# Patient Record
Sex: FEMALE | Race: ASIAN | NOT HISPANIC OR LATINO
[De-identification: names, ages, dates, MRNs, and addresses within clinical notes are randomized per-mention and may not be internally consistent; named-entity substitution may affect disease eponyms.]

---

## 2020-03-23 ENCOUNTER — TRANSCRIPTION ENCOUNTER (OUTPATIENT)
Age: 40
End: 2020-03-23

## 2020-04-26 ENCOUNTER — MESSAGE (OUTPATIENT)
Age: 40
End: 2020-04-26

## 2020-05-02 LAB
SARS-COV-2 IGG SERPL IA-ACNC: 4.6 INDEX
SARS-COV-2 IGG SERPL QL IA: POSITIVE

## 2020-05-04 ENCOUNTER — APPOINTMENT (OUTPATIENT)
Dept: DISASTER EMERGENCY | Facility: CLINIC | Age: 40
End: 2020-05-04

## 2020-08-17 ENCOUNTER — TRANSCRIPTION ENCOUNTER (OUTPATIENT)
Age: 40
End: 2020-08-17

## 2020-09-04 ENCOUNTER — TRANSCRIPTION ENCOUNTER (OUTPATIENT)
Age: 40
End: 2020-09-04

## 2020-09-04 ENCOUNTER — EMERGENCY (EMERGENCY)
Facility: HOSPITAL | Age: 40
LOS: 0 days | Discharge: HOME | End: 2020-09-04
Payer: OTHER MISCELLANEOUS

## 2020-09-04 VITALS
DIASTOLIC BLOOD PRESSURE: 82 MMHG | SYSTOLIC BLOOD PRESSURE: 132 MMHG | OXYGEN SATURATION: 100 % | WEIGHT: 134.92 LBS | HEIGHT: 62 IN | RESPIRATION RATE: 18 BRPM | HEART RATE: 81 BPM

## 2020-09-04 DIAGNOSIS — Z77.21 CONTACT WITH AND (SUSPECTED) EXPOSURE TO POTENTIALLY HAZARDOUS BODY FLUIDS: ICD-10-CM

## 2020-09-04 PROCEDURE — 99284 EMERGENCY DEPT VISIT MOD MDM: CPT

## 2020-09-04 NOTE — ED ADULT NURSE NOTE - NSIMPLEMENTINTERV_GEN_ALL_ED
Implemented All Fall with Harm Risk Interventions:  [place] to call system. Call bell, personal items and telephone within reach. Instruct patient to call for assistance. Room bathroom lighting operational. Non-slip footwear when patient is off stretcher. Physically safe environment: no spills, clutter or unnecessary equipment. Stretcher in lowest position, wheels locked, appropriate side rails in place. Provide visual cue, wrist band, yellow gown, etc. Monitor gait and stability. Monitor for mental status changes and reorient to person, place, and time. Review medications for side effects contributing to fall risk. Reinforce activity limits and safety measures with patient and family. Provide visual clues: red socks.

## 2020-09-04 NOTE — ED PROVIDER NOTE - MUSCULOSKELETAL, MLM
Right lateral wrist w/ mild erythema, no discharge, non tender. Right lateral wrist w/ mild erythema, no discharge, non tender. open sore 1x1 cm

## 2020-09-04 NOTE — ED PROVIDER NOTE - PATIENT PORTAL LINK FT
You can access the FollowMyHealth Patient Portal offered by [facility] by registering at the following website: [email or web address]. By joining [employer]’s FollowMyHealth portal, you will also be able to view your health information using other applications (apps) compatible with our system.

## 2020-09-04 NOTE — ED PROVIDER NOTE - OBJECTIVE STATEMENT
39 y/o F no pertinent PMHx presents s/p blood exposure. 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 Pt states she had open rash like wound that she had been scratching and the blood got into the wound. Pt states they did not test the patient for any Infectious Diseases.

## 2021-09-28 ENCOUNTER — TRANSCRIPTION ENCOUNTER (OUTPATIENT)
Age: 41
End: 2021-09-28

## 2022-07-05 ENCOUNTER — NON-APPOINTMENT (OUTPATIENT)
Age: 42
End: 2022-07-05

## 2022-08-24 DIAGNOSIS — M77.11 LATERAL EPICONDYLITIS, RIGHT ELBOW: ICD-10-CM

## 2022-08-24 PROBLEM — Z00.00 ENCOUNTER FOR PREVENTIVE HEALTH EXAMINATION: Status: ACTIVE | Noted: 2022-08-24

## 2022-10-03 ENCOUNTER — RX RENEWAL (OUTPATIENT)
Age: 42
End: 2022-10-03

## 2022-10-03 RX ORDER — MELOXICAM 15 MG/1
15 TABLET ORAL DAILY
Qty: 30 | Refills: 0 | Status: ACTIVE | COMMUNITY
Start: 2022-08-24 | End: 1900-01-01

## 2022-10-28 ENCOUNTER — EMERGENCY (EMERGENCY)
Facility: HOSPITAL | Age: 42
LOS: 0 days | Discharge: HOME | End: 2022-10-28

## 2022-10-28 VITALS
HEIGHT: 62 IN | TEMPERATURE: 98 F | OXYGEN SATURATION: 96 % | RESPIRATION RATE: 18 BRPM | DIASTOLIC BLOOD PRESSURE: 81 MMHG | WEIGHT: 130.07 LBS | HEART RATE: 78 BPM | SYSTOLIC BLOOD PRESSURE: 117 MMHG

## 2022-10-28 DIAGNOSIS — Z77.21 CONTACT WITH AND (SUSPECTED) EXPOSURE TO POTENTIALLY HAZARDOUS BODY FLUIDS: ICD-10-CM

## 2022-10-28 PROBLEM — Z78.9 OTHER SPECIFIED HEALTH STATUS: Chronic | Status: ACTIVE | Noted: 2020-09-04

## 2022-10-28 PROCEDURE — 99282 EMERGENCY DEPT VISIT SF MDM: CPT

## 2022-10-28 NOTE — ED PROVIDER NOTE - PATIENT PORTAL LINK FT
You can access the FollowMyHealth Patient Portal offered by [facility] by registering at the following website: [email or web address]. By joining [facility]’s FollowMyHealth portal, you will also be able to view your health information using other applications (apps) compatible with our system.

## 2022-10-28 NOTE — ED ADULT NURSE NOTE - OBJECTIVE STATEMENT
Patient A&OX3, breathing even and unlabored on room air, no acute respiratory distress noted. Patient stated she was suctioning and blood and saliva splashed into her eyes. Patient rinse eyes and reported to ED.

## 2022-10-28 NOTE — ED ADULT NURSE NOTE - NSIMPLEMENTINTERV_GEN_ALL_ED
Implemented All Universal Safety Interventions:  [place] to call system. Call bell, personal items and telephone within reach. Instruct patient to call for assistance. Room bathroom lighting operational. Non-slip footwear when patient is off stretcher. Physically safe environment: no spills, clutter or unnecessary equipment. Stretcher in lowest position, wheels locked, appropriate side rails in place.

## 2022-10-28 NOTE — ED PROVIDER NOTE - CLINICAL SUMMARY MEDICAL DECISION MAKING FREE TEXT BOX
42y Female with no sig PMHx presents to the ER for body fluid in eye. 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 She immediately washed out eye at the eye station. Up to date on all vaccinations. Does not want to start PEP. Vital signs stable, exam unremarkable, will draw labs and dc with [facility] follow up

## 2022-10-28 NOTE — ED PROVIDER NOTE - NSFOLLOWUPINSTRUCTIONS_ED_ALL_ED_FT
Today you were seen in the ER for body fluid in eye.     FOLLOW UP WITH [facility] IN 72 HOURS. CALL TODAY TO SCHEDULE AN APPOINTMENT    Advance activity as tolerated.      Continue all previously prescribed medications as directed unless otherwise instructed.      Follow up with your primary care physician in 48-72 hours- bring copies of your results.      Return to the ER for worsening or persistent symptoms, and/or ANY NEW OR CONCERNING SYMPTOMS including but not limited to acute changes in vision including double or blurry vision.

## 2022-10-28 NOTE — ED PROVIDER NOTE - OBJECTIVE STATEMENT
42y Female with no sig PMHx presents to the ER for body fluid in eye. 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 She immediately washed out eye at the eye station. Up to date on all vaccinations. Denies history of HIV, hep B or C. States there was no documentation of any diseases in [place] patients chart. Patient does not want to start PEP.

## 2022-12-11 ENCOUNTER — NON-APPOINTMENT (OUTPATIENT)
Age: 42
End: 2022-12-11

## 2023-01-17 NOTE — ED ADULT TRIAGE NOTE - BP NONINVASIVE DIASTOLIC (MM HG)
Patient Instructions   Problem List Items Addressed This Visit          Cardiac and Vasculature    Mixed hyperlipidemia    Overview     Taking 1/2 of the 5 mg rosuvastatin tablet 3 times a week.         Current Assessment & Plan     Continue current dose of rosuvastatin. Recheck lipids today.         Relevant Medications    rosuvastatin (CRESTOR) 5 MG tablet    Other Relevant Orders    Lipid Panel    Mild essential hypertension    Overview     SBP got down to 50s on bisoprolol. Off it SBP has been 130 or less.         Current Assessment & Plan     We will start losartan 25 mg tablet daily. Continue to avoid salt in the diet. Continue trying to get some regular walking and exercise. She is going to record her blood pressure values from home and bring it to her next visit in 3 weeks.         Relevant Medications    losartan (COZAAR) 25 MG tablet       Gastrointestinal Abdominal     Gastroesophageal reflux disease without esophagitis (Chronic)    Overview     Taking 20 mg famotidine once a day.               Current Assessment & Plan     Increase famotidine to 3 times per day for the next 2 weeks, then twice per day ongoing. Continue to avoid eating close to bedtime and avoid large meals. Continue to avoid fatty meals.         Relevant Medications    famotidine (PEPCID) 20 MG tablet    Midepigastric pain - Primary (Chronic)    Current Assessment & Plan     Ultrasound of the gallbladder. Check liver enzymes, blood cell counts, and pancreatic enzymes.         Relevant Orders    US Gallbladder (Completed)    Comprehensive Metabolic Panel    CBC & Differential    Amylase    Lipase    Bilious vomiting with nausea    Current Assessment & Plan     Ultrasound of the gallbladder. Check liver enzymes, blood cell counts, and pancreatic enzymes.         Relevant Orders    US Gallbladder (Completed)       Neuro    Acute nonintractable headache (Chronic)    Current Assessment & Plan     Probably secondary to tension toward the end  of the workday. She may continue to take Tylenol or an occasional Advil.               82

## 2023-05-20 NOTE — ED ADULT TRIAGE NOTE - HEIGHT IN CM
157.48 -Pt w/ hx of anxiety, takes clonazepam 0.5 q8 and zolpidem 5mg qhs, remeron  -s/p CIWA bundle  > c/w home clonazepam .5 q8

## 2023-06-22 RX ORDER — SODIUM SULFATE, POTASSIUM SULFATE AND MAGNESIUM SULFATE 1.6; 3.13; 17.5 G/177ML; G/177ML; G/177ML
17.5-3.13-1.6 SOLUTION ORAL
Qty: 1 | Refills: 0 | Status: ACTIVE | COMMUNITY
Start: 2023-06-22 | End: 1900-01-01

## 2023-07-07 RX ORDER — SODIUM SULFATE, POTASSIUM SULFATE AND MAGNESIUM SULFATE 1.6; 3.13; 17.5 G/177ML; G/177ML; G/177ML
17.5-3.13-1.6 SOLUTION ORAL
Qty: 1 | Refills: 0 | Status: ACTIVE | COMMUNITY
Start: 2023-07-07 | End: 1900-01-01

## 2023-07-07 RX ORDER — BISACODYL 5 MG/1
5 TABLET ORAL
Qty: 4 | Refills: 0 | Status: ACTIVE | COMMUNITY
Start: 2023-07-07 | End: 1900-01-01

## 2023-08-02 RX ORDER — SULFAMETHOXAZOLE AND TRIMETHOPRIM 800; 160 MG/1; MG/1
800-160 TABLET ORAL TWICE DAILY
Qty: 6 | Refills: 0 | Status: ACTIVE | COMMUNITY
Start: 2023-08-02 | End: 1900-01-01

## 2023-12-13 ENCOUNTER — NON-APPOINTMENT (OUTPATIENT)
Age: 43
End: 2023-12-13

## 2024-02-07 NOTE — ED ADULT NURSE NOTE - CCCP TRG CHIEF CMPLNT
0228 Patient came out with low blood pressure. Md Brunson said ok for patient to go to the floor with map above 60 and as long as heart rate was below 110.   No visitors' at bedside. Patient did talk with mom on phone.  0300 mouth swabbed, Gown put on patient. Clothes and phone at bedside.   
body fluid in eye

## 2024-02-29 ENCOUNTER — EMERGENCY (EMERGENCY)
Facility: HOSPITAL | Age: 44
LOS: 0 days | Discharge: HOME | End: 2024-02-29
Payer: OTHER MISCELLANEOUS

## 2024-02-29 VITALS
SYSTOLIC BLOOD PRESSURE: 132 MMHG | OXYGEN SATURATION: 100 % | RESPIRATION RATE: 18 BRPM | HEART RATE: 86 BPM | TEMPERATURE: 98 F | HEIGHT: 62 IN | DIASTOLIC BLOOD PRESSURE: 90 MMHG | WEIGHT: 138.89 LBS

## 2024-02-29 VITALS
HEART RATE: 65 BPM | SYSTOLIC BLOOD PRESSURE: 125 MMHG | TEMPERATURE: 99 F | DIASTOLIC BLOOD PRESSURE: 89 MMHG | OXYGEN SATURATION: 100 % | RESPIRATION RATE: 15 BRPM

## 2024-02-29 DIAGNOSIS — S69.91XA UNSPECIFIED INJURY OF RIGHT WRIST, HAND AND FINGER(S), INITIAL ENCOUNTER: ICD-10-CM
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
## 2024-02-29 PROCEDURE — 99283 EMERGENCY DEPT VISIT LOW MDM: CPT

## 2024-02-29 NOTE — ED ADULT NURSE NOTE - NSFALLUNIVINTERV_ED_ALL_ED
Bed/Stretcher in lowest position, wheels locked, appropriate side rails in place/Call bell, personal items and telephone in reach/Instruct patient to call for assistance before getting out of bed/chair/stretcher/Non-slip footwear applied when patient is off stretcher/[place] to call system/Physically safe environment - no spills, clutter or unnecessary equipment/Purposeful proactive rounding/Room/bathroom lighting operational, light cord in reach

## 2024-02-29 NOTE — ED PROVIDER NOTE - PATIENT PORTAL LINK FT
You can access the FollowMyHealth Patient Portal offered by [facility] by registering at the following website: [email or web address]. By joining [facility] [email or web address]’s FollowMyHealth portal, you will also be able to view your health information using other applications (apps) compatible with our system.

## 2024-02-29 NOTE — ED PROVIDER NOTE - DISPOSITION TYPE
Called [facility] @ [phone] spoke with Martha gave last name male, time/type, weight, length, and APGAR.
DISCHARGE

## 2024-02-29 NOTE — ED PROVIDER NOTE - CLINICAL SUMMARY MEDICAL DECISION MAKING FREE TEXT BOX
43y Female with no significant past medical history presents to the ER for needle stick. Patient reports working upstairs where she grabbed a tommy that had a needle on and was stuck in the right hand near her 4th finger, small blood, no wound now. Tetanus up to date, no hepatitis, does not want post exposure prophylaxis. Source blood obtained, will get labs and dc.

## 2024-02-29 NOTE — ED PROVIDER NOTE - OBJECTIVE STATEMENT
43y Female with no significant past medical history presents to the ER for needle stick. Patient reports working upstairs where she grabbed a tommy that had a needle on and was stuck in the right hand near her 4th finger, small blood, no wound now. Source blood obtained on the floors, denies additional pain or complaints. Tetanus up to date, no hepatitis, does not want post exposure prophylaxis.

## 2024-02-29 NOTE — ED PROVIDER NOTE - NSFOLLOWUPINSTRUCTIONS_ED_ALL_ED_FT
Today you were seen in the ER for needle stick.     FOLLOW UP WITH [facility] IN 47-72 HOURS.     SEEK IMMEDIATE MEDICAL CARE IF YOU HAVE ANY OF THE FOLLOWING SYMPTOMS: severe pain, fever, red streaks coming from affected area, redness, swelling pus drainage from area of injury.     Advance activity as tolerated.      Continue all previously prescribed medications as directed unless otherwise instructed.      Follow up with your primary care physician in 48-72 hours- bring copies of your results.

## 2024-09-04 NOTE — ED PROVIDER NOTE - PRO INTERPRETER NEED 2
Pt called stating that the mybetriq cost $372 and she could not find any coupons or discounts. Pt is wondering if there is a cheaper option available.    English